# Patient Record
Sex: FEMALE | Race: WHITE | NOT HISPANIC OR LATINO | URBAN - METROPOLITAN AREA
[De-identification: names, ages, dates, MRNs, and addresses within clinical notes are randomized per-mention and may not be internally consistent; named-entity substitution may affect disease eponyms.]

---

## 2019-04-16 ENCOUNTER — OFFICE VISIT (OUTPATIENT)
Dept: URBAN - METROPOLITAN AREA CLINIC 71 | Facility: CLINIC | Age: 49
End: 2019-04-16
Payer: COMMERCIAL

## 2019-04-16 DIAGNOSIS — H10.45 OTHER CHRONIC ALLERGIC CONJUNCTIVITIS: ICD-10-CM

## 2019-04-16 DIAGNOSIS — H52.13 MYOPIA, BILATERAL: Primary | ICD-10-CM

## 2019-04-16 PROCEDURE — 92015 DETERMINE REFRACTIVE STATE: CPT | Performed by: OPTOMETRIST

## 2019-04-16 PROCEDURE — 92310 CONTACT LENS FITTING OU: CPT | Performed by: OPTOMETRIST

## 2019-04-16 PROCEDURE — 92014 COMPRE OPH EXAM EST PT 1/>: CPT | Performed by: OPTOMETRIST

## 2019-04-16 ASSESSMENT — VISUAL ACUITY
OD: 20/20-
OS: 20/20

## 2019-04-16 ASSESSMENT — INTRAOCULAR PRESSURE
OD: 16
OS: 16

## 2019-04-16 NOTE — IMPRESSION/PLAN
Impression: Diagnosis: Other chronic allergic conjunctivitis. Code: H10.45. not bothersome now Plan: Use ketotifen up to BID PRN for itch. Before and after CL use. Call if worsens.

## 2019-04-16 NOTE — IMPRESSION/PLAN
Impression: Diagnosis: Myopia, bilateral. Code: H52.13. Plan: A glasses and contact lens prescription has been discussed and generated. Give contact lens trials to the patient. The patient should try and approve them. No contact lens follow-up is needed unless the patient has concerns with the final prescription.

## 2020-07-01 ENCOUNTER — OFFICE VISIT (OUTPATIENT)
Dept: URBAN - METROPOLITAN AREA CLINIC 25 | Facility: CLINIC | Age: 50
End: 2020-07-01
Payer: COMMERCIAL

## 2020-07-01 PROCEDURE — 92014 COMPRE OPH EXAM EST PT 1/>: CPT | Performed by: OPTOMETRIST

## 2020-07-01 PROCEDURE — 92310 CONTACT LENS FITTING OU: CPT | Performed by: OPTOMETRIST

## 2020-07-01 ASSESSMENT — VISUAL ACUITY
OD: 20/20
OS: 20/20

## 2020-07-01 ASSESSMENT — KERATOMETRY
OD: 43.94
OS: 44.25

## 2020-07-01 ASSESSMENT — INTRAOCULAR PRESSURE
OD: 16
OS: 13

## 2021-08-04 ENCOUNTER — OFFICE VISIT (OUTPATIENT)
Dept: URBAN - METROPOLITAN AREA CLINIC 16 | Facility: CLINIC | Age: 51
End: 2021-08-04
Payer: COMMERCIAL

## 2021-08-04 DIAGNOSIS — H52.4 PRESBYOPIA: ICD-10-CM

## 2021-08-04 PROCEDURE — 92012 INTRM OPH EXAM EST PATIENT: CPT | Performed by: OPTOMETRIST

## 2021-08-04 PROCEDURE — 92310 CONTACT LENS FITTING OU: CPT | Performed by: OPTOMETRIST

## 2021-08-04 ASSESSMENT — INTRAOCULAR PRESSURE
OS: 12
OD: 14

## 2021-08-04 ASSESSMENT — VISUAL ACUITY
OS: 20/20
OD: 20/20

## 2022-12-15 ENCOUNTER — OFFICE VISIT (OUTPATIENT)
Dept: URBAN - METROPOLITAN AREA CLINIC 16 | Facility: CLINIC | Age: 52
End: 2022-12-15
Payer: OTHER GOVERNMENT

## 2022-12-15 DIAGNOSIS — H43.811 VITREOUS DEGENERATION, RIGHT EYE: Primary | ICD-10-CM

## 2022-12-15 PROCEDURE — 92014 COMPRE OPH EXAM EST PT 1/>: CPT | Performed by: OPTOMETRIST

## 2022-12-15 ASSESSMENT — INTRAOCULAR PRESSURE
OS: 13
OD: 14

## 2022-12-15 NOTE — IMPRESSION/PLAN
Impression: Vitreous degeneration, right eye: H43.811. Plan: Vitreous floaters accounts for symptoms. Discussed signs/symptoms of retinal detachment. OPTOS photos ordered and reviewed. RTC immediately if symptoms worsen/occur; otherwise, RTC 2-3 months x OPTOS photos and/or MAC OCT.